# Patient Record
Sex: MALE | Race: WHITE | NOT HISPANIC OR LATINO | Employment: FULL TIME | URBAN - METROPOLITAN AREA
[De-identification: names, ages, dates, MRNs, and addresses within clinical notes are randomized per-mention and may not be internally consistent; named-entity substitution may affect disease eponyms.]

---

## 2023-11-16 DIAGNOSIS — I48.0 PAROXYSMAL ATRIAL FIBRILLATION: Primary | ICD-10-CM

## 2023-12-06 ENCOUNTER — TELEPHONE (OUTPATIENT)
Dept: CARDIOLOGY | Facility: CLINIC | Age: 58
End: 2023-12-06
Payer: COMMERCIAL

## 2023-12-06 NOTE — TELEPHONE ENCOUNTER
Name: Fabian Good    Relationship: Self    Best Callback Number: [phone]    Incoming call to the Hub, requesting to  Reschedule their HFU appointment on 12.07.23.     Per Hub workflow, further review is needed before the task can be completed.    Result of Call: Transferred to [facility] at the [facility]

## 2024-01-10 NOTE — PROGRESS NOTES
[facility] Cardiology  Follow Up Visit  Fabian Good  1965    VISIT DATE:  24    PCP:   Bam Ortiz MD  [address] [facility] [address]          CC:  Dizziness and Loss of Consciousness      Problem List:  Hypertension  Hyperlipidemia  Paroxysmal A-fib  Cholecystectomy  Sycnope    Echo  2023:  EF 50-55%, normal valve function,  LVH    History of Present Illness:  Fabian Good  Is a 58 y.o. male with pertinent cardiac history detailed above.  Patient was seen at [facility] in November with new diagnosis of A-fib with RVR.  Placed on Eliquis at that time.    He did have an episode of syncope 24 while at work.  He does have history of prior syncopal events.  He has passed out receiving a needle before.  Had another episode that may have been vasovagal.    He was walking to unload  a truck  when he passed out.  He felt lightheaded  prior to LOC.  Went to the ED at [facility].  He works construction.      He states he was hypotensive at  [facility] ED and responded to  IV fluids.  He  did take lisinopril 20mg that day.      No c/o CP or SOB.  He does report cold feet bilaterally.  Does have a history suspicious for Raynaud's in the hands but has not had blue or white discoloration of the feet.      There are no problems to display for this patient.      No Known Allergies    Social History     Socioeconomic History    Marital status:    Tobacco Use    Smoking status: Former     Types: Cigarettes     Quit date:      Years since quittin.0    Smokeless tobacco: Former     Quit date: 2023   Vaping Use    Vaping Use: Every day    Substances: Nicotine   Substance and Sexual Activity    Alcohol use: Never    Drug use: Never    Sexual activity: Defer       Family History   Problem Relation Age of Onset    Atrial fibrillation Mother        Current Medications:    Current Outpatient Medications:     atorvastatin (LIPITOR) 20 MG  "tablet, Take 1 tablet by mouth Daily., Disp: , Rfl:     Coenzyme Q10 (CoQ10) 200 MG capsule, Take 1 capsule by mouth Daily., Disp: , Rfl:     Eliquis 5 MG tablet tablet, Take 1 tablet by mouth Every 12 (Twelve) Hours., Disp: , Rfl:     pantoprazole (PROTONIX) 40 MG EC tablet, Take 1 tablet by mouth Daily., Disp: , Rfl:      Review of Systems   Cardiovascular:  Positive for syncope.        Cold feet bilaterally   Neurological:  Positive for light-headedness.       Vitals:    01/11/24 1005   BP: 162/100   BP Location: Left arm   Patient Position: Sitting   Pulse: 83   SpO2: 97%   Weight: 88 kg (194 lb)   Height: 185.4 cm (73\")       Physical Exam  Constitutional:       Appearance: Normal appearance.   Neck:      Vascular: No carotid bruit.   Cardiovascular:      Rate and Rhythm: Normal rate and regular rhythm.      Comments: 1+ pulses in the feet bilaterally  Pulmonary:      Effort: Pulmonary effort is normal.      Breath sounds: Normal breath sounds.   Musculoskeletal:      Right lower leg: No edema.      Left lower leg: No edema.   Neurological:      Mental Status: He is alert.         Diagnostic Data:  Procedures  No results found for: \"CHLPL\", \"TRIG\", \"HDL\", \"LDLDIRECT\"  No results found for: \"GLUCOSE\", \"BUN\", \"CREATININE\", \"NA\", \"K\", \"CL\", \"CO2\"  No results found for: \"HGBA1C\"  No results found for: \"WBC\", \"HGB\", \"HCT\", \"PLT\"    Assessment:  No diagnosis found.    Plan:    Paroxysmal atrial fibrillation  Currently on Eliquis  Echo EF 50-55%, LVH, normal valve function    Holter for A-fib burden  Hypertension  Remain off of lisinopril for now due to recent hypotension  Starting metoprolol 25 mg twice daily for atrial fibrillation prevention  Hyperlipidemia  Atorvastatin 20 mg  Syncope  Potentially vasovagal in nature.  Obtain carotid duplex and Holter monitor to complete workup  Echo with no cause for syncope  Cold feet bilaterally  Check KARLA to screen for PAD        ACP discussion was held with the patient during " this visit. Patient does not have an advance directive, declines further assistance.      Ross Estevez MD [facility]

## 2024-01-11 ENCOUNTER — OFFICE VISIT (OUTPATIENT)
Dept: CARDIOLOGY | Facility: CLINIC | Age: 59
End: 2024-01-11
Payer: COMMERCIAL

## 2024-01-11 VITALS
DIASTOLIC BLOOD PRESSURE: 100 MMHG | OXYGEN SATURATION: 97 % | HEART RATE: 83 BPM | HEIGHT: 73 IN | WEIGHT: 194 LBS | SYSTOLIC BLOOD PRESSURE: 162 MMHG | BODY MASS INDEX: 25.71 KG/M2

## 2024-01-11 DIAGNOSIS — R20.9 COLD FEET: ICD-10-CM

## 2024-01-11 DIAGNOSIS — R55 SYNCOPE AND COLLAPSE: Primary | ICD-10-CM

## 2024-01-11 RX ORDER — APIXABAN 5 MG/1
1 TABLET, FILM COATED ORAL EVERY 12 HOURS SCHEDULED
COMMUNITY
Start: 2024-01-05

## 2024-01-11 RX ORDER — PANTOPRAZOLE SODIUM 40 MG/1
1 TABLET, DELAYED RELEASE ORAL DAILY
COMMUNITY
Start: 2023-11-09

## 2024-01-11 RX ORDER — ATORVASTATIN CALCIUM 20 MG/1
20 TABLET, FILM COATED ORAL DAILY
COMMUNITY

## 2024-01-11 RX ORDER — AMPICILLIN TRIHYDRATE 250 MG
1 CAPSULE ORAL DAILY
COMMUNITY

## 2024-01-15 ENCOUNTER — TELEPHONE (OUTPATIENT)
Dept: CARDIOLOGY | Facility: CLINIC | Age: 59
End: 2024-01-15
Payer: COMMERCIAL

## 2024-01-15 NOTE — TELEPHONE ENCOUNTER
Patient called and states that his HR dropped into the 40s after he started metoprolol. Pt states he took 25 mg Thursday night 1/11 and Friday morning 1/12. HR Friday was 48. Pt states that his HR is usually in the 50s. Pt felt fatigued, not able to do much on Friday. Pt has not taken metoprolol since Friday morning.    BP 120s-130s/58-70s    Please advise

## 2024-01-15 NOTE — TELEPHONE ENCOUNTER
He can discontinue the metoprolol since it is causing symptomatic low heart rates and blood pressure.  Continue Eliquis.  He has a Holter monitor we will use to look for any recurrence of atrial fibrillation

## 2024-02-01 ENCOUNTER — TELEPHONE (OUTPATIENT)
Dept: CARDIOLOGY | Facility: CLINIC | Age: 59
End: 2024-02-01
Payer: COMMERCIAL

## 2024-02-01 NOTE — TELEPHONE ENCOUNTER
----- Message from Ross Estevez MD sent at 1/31/2024  2:55 PM EST -----  Can we let patient know that his Holter showed a short episode of SVT but he has not had further A-fib episodes.  No additional changes needed at this time.

## 2024-02-01 NOTE — TELEPHONE ENCOUNTER
Pt called, LVM for return call.     Called patient regarding NSK results and recommendations above. All questions answered at this time. Patient verbalizes understanding and agreeable to plan.

## 2024-08-14 NOTE — PROGRESS NOTES
[facility] Cardiology  Follow Up Visit  Fabian Good  1965    VISIT DATE:  24    PCP:   Bam Ortiz MD  [address] [facility] [address]          CC:  No chief complaint on file.      Problem List:  Hypertension  Hyperlipidemia  Paroxysmal A-fib  Cholecystectomy  Sycnope     Echo  2023:  EF 50-55%, normal valve function,  LVH    Carotid duplex 2024: Less than 50% stenosis bilaterally, vertebral artery flow antegrade bilaterally    Normal KARLA 2024       History of Present Illness:  Fabian Good  Is a 58 y.o. male with pertinent cardiac history detailed above.  Patient has not had any syncopal episodes since last seen.  At that time his blood pressure medication was being adjusted.  It is possible he also had an episode of atrial fibrillation around that time.  He has not felt any sustained palpitations.  Holter monitor performed since last visit showed no recurrent A-fib.  His [id number] score is 1 and we discussed pros/cons of discontinuing Eliquis.  Given that his stroke risk is less than 1% I do think he can stop Eliquis and he will take a baby aspirin at this time.  His other cardiac testing since last visit returned reassuring.  Cholesterol and blood pressure well-controlled      There are no problems to display for this patient.      No Known Allergies    Social History     Socioeconomic History    Marital status:    Tobacco Use    Smoking status: Former     Current packs/day: 0.00     Types: Cigarettes     Quit date:      Years since quittin.6    Smokeless tobacco: Former     Quit date: 2023   Vaping Use    Vaping status: Every Day    Substances: Nicotine   Substance and Sexual Activity    Alcohol use: Never    Drug use: Never    Sexual activity: Defer       Family History   Problem Relation Age of Onset    Atrial fibrillation Mother        Current Medications:    Current Outpatient Medications:     atorvastatin  "(LIPITOR) 20 MG tablet, Take 1 tablet by mouth Daily., Disp: , Rfl:     Coenzyme Q10 (CoQ10) 200 MG capsule, Take 1 capsule by mouth Daily., Disp: , Rfl:     Eliquis 5 MG tablet tablet, Take 1 tablet by mouth Every 12 (Twelve) Hours., Disp: , Rfl:     pantoprazole (PROTONIX) 40 MG EC tablet, Take 1 tablet by mouth Daily., Disp: , Rfl:      Review of Systems   Cardiovascular: Negative.  Negative for chest pain, palpitations and syncope.   Respiratory: Negative.         There were no vitals filed for this visit.    Physical Exam  Constitutional:       Appearance: Normal appearance.   Neck:      Vascular: No carotid bruit.   Cardiovascular:      Rate and Rhythm: Normal rate and regular rhythm.      Pulses: Normal pulses.      Heart sounds: Normal heart sounds.   Pulmonary:      Effort: Pulmonary effort is normal.      Breath sounds: Normal breath sounds.   Musculoskeletal:      Right lower leg: No edema.      Left lower leg: No edema.   Neurological:      Mental Status: He is alert.         Diagnostic Data:  Procedures  No results found for: \"CHLPL\", \"TRIG\", \"HDL\", \"LDLDIRECT\"  No results found for: \"GLUCOSE\", \"BUN\", \"CREATININE\", \"NA\", \"K\", \"CL\", \"CO2\"  No results found for: \"HGBA1C\"  No results found for: \"WBC\", \"HGB\", \"HCT\", \"PLT\"    Assessment:  No diagnosis found.    Plan:    Paroxysmal atrial fibrillation  On Eliquis but with [id number] 1 I am okay with him going down aspirin 81 mg daily  Echo EF 50-55%, LVH, normal valve function  Atrial tachycardia on Holter monitor.  January 2024 no A-fib  [id number] 1  Hypertension  Within normal limits on lisinopril 10 mg  Hyperlipidemia  Atorvastatin 20 mg  LDL 61  Syncope  Potentially related to  afib versus orthostasis  Holter with short SVT/atrial tachycardia episodes.  Longest 18 beats  Echo with no cause for syncope  Carotid duplex January 2024 less than 50% stenosis bilaterally.  Antegrade vertebral artery flow bilaterally  No recurrences  Cold feet " bilaterally  KARLA March 2024: Normal KARLA bilaterally.  Right is 0.9, left is 1     Follow-up in 1 year or sooner as needed.  Discussed A-fib symptoms to be aware of    ACP discussion was held with the patient during this visit. Patient does not have an advance directive, declines further assistance.    Ross Estevez MD [facility]

## 2024-08-15 ENCOUNTER — OFFICE VISIT (OUTPATIENT)
Dept: CARDIOLOGY | Facility: CLINIC | Age: 59
End: 2024-08-15
Payer: COMMERCIAL

## 2024-08-15 VITALS
DIASTOLIC BLOOD PRESSURE: 88 MMHG | SYSTOLIC BLOOD PRESSURE: 132 MMHG | OXYGEN SATURATION: 97 % | BODY MASS INDEX: 31.81 KG/M2 | HEIGHT: 73 IN | HEART RATE: 66 BPM | WEIGHT: 240 LBS

## 2024-08-15 DIAGNOSIS — I48.0 PAROXYSMAL ATRIAL FIBRILLATION: Primary | ICD-10-CM

## 2024-08-15 DIAGNOSIS — I10 PRIMARY HYPERTENSION: ICD-10-CM

## 2024-08-15 PROCEDURE — 99214 OFFICE O/P EST MOD 30 MIN: CPT | Performed by: INTERNAL MEDICINE

## 2024-08-15 RX ORDER — ASPIRIN 81 MG/1
81 TABLET ORAL DAILY
Start: 2024-08-15

## 2024-08-15 RX ORDER — LISINOPRIL 10 MG/1
1 TABLET ORAL DAILY
COMMUNITY
Start: 2024-07-16

## 2024-08-15 RX ORDER — CYANOCOBALAMIN 1000 UG/ML
1000 INJECTION, SOLUTION INTRAMUSCULAR; SUBCUTANEOUS
COMMUNITY
Start: 2024-07-06